# Patient Record
Sex: FEMALE | Race: AMERICAN INDIAN OR ALASKA NATIVE
[De-identification: names, ages, dates, MRNs, and addresses within clinical notes are randomized per-mention and may not be internally consistent; named-entity substitution may affect disease eponyms.]

---

## 2018-09-27 ENCOUNTER — HOSPITAL ENCOUNTER (OUTPATIENT)
Dept: HOSPITAL 5 - MRI | Age: 73
Discharge: HOME | End: 2018-09-27
Attending: PSYCHIATRY & NEUROLOGY
Payer: MEDICARE

## 2018-09-27 DIAGNOSIS — R79.89: ICD-10-CM

## 2018-09-27 DIAGNOSIS — J34.1: ICD-10-CM

## 2018-09-27 DIAGNOSIS — J01.30: Primary | ICD-10-CM

## 2018-09-27 LAB — BUN SERPL-MCNC: 22 MG/DL (ref 7–17)

## 2018-09-27 PROCEDURE — 82962 GLUCOSE BLOOD TEST: CPT

## 2018-09-27 PROCEDURE — 36415 COLL VENOUS BLD VENIPUNCTURE: CPT

## 2018-09-27 PROCEDURE — 82565 ASSAY OF CREATININE: CPT

## 2018-09-27 PROCEDURE — 70553 MRI BRAIN STEM W/O & W/DYE: CPT

## 2018-09-27 PROCEDURE — A9577 INJ MULTIHANCE: HCPCS

## 2018-09-27 PROCEDURE — 84520 ASSAY OF UREA NITROGEN: CPT

## 2018-10-01 NOTE — MAGNETIC RESONANCE REPORT
MR scan of the cranium was performed with and without contrast.

Pulse sequences included:

1.  T1 weighted sagittal and axial images without contrast and T1 axial 

and coronal images with contrast

2.  T2 weighted axial and coronal images

3.  FLAIR axial images

4.  Diffusion-weighted axial images

5.  Apparent diffusion coefficient images



Views of the posterior fossa showed a normal craniocervical junction.  

Cerebellar pontine angles were normal with normal seventh-eighth nerve 

complexes.  Brainstem and cerebellum were normal.



The ventricular system showed no dilatation or distortion.



Images of the hemispheres showed numerous white matter lesions in the 

periventricular white matter some consistent with Love's fingers.  

Some lesions were present at the grey white junction.  Numerous black 

holes were seen. 



Sinuses showed a mucus retention cyst in the left sphenoid sinus.  

Pituitary, flow voids in the [place], orbits, and basal 

ganglia were normal.



There are no abnormal areas of enhancement with contrast.







Impression:



Abnormal MR scan of the cranium with and without contrast

a. left sphenoid sinus mucus retention cyst

b. numerous white matter lesions and black holes consistent with the 

diagnosis of multiple sclerosis





this study was compared with the previous study from 2015 and little 

change is appreciated